# Patient Record
Sex: MALE | Race: WHITE | NOT HISPANIC OR LATINO | URBAN - METROPOLITAN AREA
[De-identification: names, ages, dates, MRNs, and addresses within clinical notes are randomized per-mention and may not be internally consistent; named-entity substitution may affect disease eponyms.]

---

## 2017-10-12 PROBLEM — G62.9 NEUROPATHY: Status: ACTIVE | Noted: 2017-10-12

## 2017-10-12 PROBLEM — Z78.9 NON-SMOKER: Status: ACTIVE | Noted: 2017-10-12

## 2017-10-12 PROBLEM — Z83.3 FAMILY HISTORY OF DIABETES MELLITUS: Status: ACTIVE | Noted: 2017-10-12

## 2017-10-12 PROBLEM — R50.9 FEVER: Status: ACTIVE | Noted: 2017-10-12

## 2018-06-27 PROBLEM — Z87.19 HISTORY OF DIVERTICULITIS: Status: RESOLVED | Noted: 2018-06-27 | Resolved: 2018-06-27

## 2020-01-08 PROBLEM — E05.90 HYPERTHYROIDISM, SUBCLINICAL: Status: ACTIVE | Noted: 2019-06-28

## 2020-05-15 PROBLEM — Z79.899 LONG-TERM USE OF IMMUNOSUPPRESSANT MEDICATION: Status: ACTIVE | Noted: 2020-05-15

## 2021-03-25 PROBLEM — M40.209 KYPHOSIS, ACQUIRED: Status: ACTIVE | Noted: 2021-03-25

## 2021-03-25 PROBLEM — M48.07 LUMBOSACRAL STENOSIS: Status: ACTIVE | Noted: 2021-03-25

## 2021-03-25 PROBLEM — M54.16 LUMBAR RADICULOPATHY: Status: ACTIVE | Noted: 2021-03-25

## 2021-03-25 PROBLEM — M41.9 SCOLIOSIS: Status: ACTIVE | Noted: 2021-03-25

## 2021-04-26 PROBLEM — E87.5 HYPERKALEMIA: Status: ACTIVE | Noted: 2018-11-14

## 2021-04-26 PROBLEM — M19.90 INFLAMMATORY ARTHRITIS: Status: ACTIVE | Noted: 2019-03-22

## 2021-04-26 PROBLEM — H04.123 BILATERAL DRY EYES: Status: ACTIVE | Noted: 2020-05-15

## 2021-04-26 PROBLEM — M54.5 LOW BACK PAIN: Status: ACTIVE | Noted: 2021-01-14

## 2021-04-26 PROBLEM — M79.604 PAIN AND SWELLING OF RIGHT LOWER EXTREMITY: Status: ACTIVE | Noted: 2020-09-22

## 2021-04-26 PROBLEM — M35.01 SJOGREN'S SYNDROME WITH KERATOCONJUNCTIVITIS SICCA: Status: ACTIVE | Noted: 2018-06-27

## 2021-04-26 PROBLEM — J82.81 CHRONIC EOSINOPHILIC PNEUMONIA: Status: ACTIVE | Noted: 2018-06-27

## 2021-04-26 PROBLEM — I73.00 RAYNAUD PHENOMENON: Status: ACTIVE | Noted: 2017-10-12

## 2024-07-13 ENCOUNTER — INPATIENT (INPATIENT)
Facility: HOSPITAL | Age: 69
LOS: 2 days | Discharge: HOME | DRG: 310 | End: 2024-07-16
Attending: INTERNAL MEDICINE | Admitting: INTERNAL MEDICINE
Payer: MEDICARE

## 2024-07-13 VITALS
RESPIRATION RATE: 18 BRPM | HEIGHT: 72 IN | TEMPERATURE: 98 F | DIASTOLIC BLOOD PRESSURE: 75 MMHG | HEART RATE: 57 BPM | OXYGEN SATURATION: 97 % | WEIGHT: 210.1 LBS | SYSTOLIC BLOOD PRESSURE: 157 MMHG

## 2024-07-13 DIAGNOSIS — R00.2 PALPITATIONS: ICD-10-CM

## 2024-07-13 DIAGNOSIS — R06.02 SHORTNESS OF BREATH: ICD-10-CM

## 2024-07-13 DIAGNOSIS — I10 ESSENTIAL (PRIMARY) HYPERTENSION: ICD-10-CM

## 2024-07-13 DIAGNOSIS — R79.89 OTHER SPECIFIED ABNORMAL FINDINGS OF BLOOD CHEMISTRY: ICD-10-CM

## 2024-07-13 LAB
ALBUMIN SERPL ELPH-MCNC: 4.4 G/DL — SIGNIFICANT CHANGE UP (ref 3.3–5)
ALP SERPL-CCNC: 126 U/L — HIGH (ref 40–120)
ALT FLD-CCNC: 13 U/L — SIGNIFICANT CHANGE UP (ref 10–45)
ANION GAP SERPL CALC-SCNC: 16 MMOL/L — SIGNIFICANT CHANGE UP (ref 5–17)
APPEARANCE UR: CLEAR — SIGNIFICANT CHANGE UP
AST SERPL-CCNC: 16 U/L — SIGNIFICANT CHANGE UP (ref 10–40)
BASOPHILS # BLD AUTO: 0.06 K/UL — SIGNIFICANT CHANGE UP (ref 0–0.2)
BASOPHILS NFR BLD AUTO: 0.7 % — SIGNIFICANT CHANGE UP (ref 0–2)
BILIRUB SERPL-MCNC: 0.8 MG/DL — SIGNIFICANT CHANGE UP (ref 0.2–1.2)
BILIRUB UR-MCNC: NEGATIVE — SIGNIFICANT CHANGE UP
BUN SERPL-MCNC: 16 MG/DL — SIGNIFICANT CHANGE UP (ref 7–23)
CALCIUM SERPL-MCNC: 9.6 MG/DL — SIGNIFICANT CHANGE UP (ref 8.4–10.5)
CHLORIDE SERPL-SCNC: 96 MMOL/L — SIGNIFICANT CHANGE UP (ref 96–108)
CO2 SERPL-SCNC: 23 MMOL/L — SIGNIFICANT CHANGE UP (ref 22–31)
COLOR SPEC: YELLOW — SIGNIFICANT CHANGE UP
CREAT SERPL-MCNC: 1.12 MG/DL — SIGNIFICANT CHANGE UP (ref 0.5–1.3)
DIFF PNL FLD: NEGATIVE — SIGNIFICANT CHANGE UP
EGFR: 70 ML/MIN/1.73M2 — SIGNIFICANT CHANGE UP
EOSINOPHIL # BLD AUTO: 0.22 K/UL — SIGNIFICANT CHANGE UP (ref 0–0.5)
EOSINOPHIL NFR BLD AUTO: 2.7 % — SIGNIFICANT CHANGE UP (ref 0–6)
GLUCOSE SERPL-MCNC: 119 MG/DL — HIGH (ref 70–99)
GLUCOSE UR QL: NEGATIVE MG/DL — SIGNIFICANT CHANGE UP
HCT VFR BLD CALC: 46 % — SIGNIFICANT CHANGE UP (ref 39–50)
HGB BLD-MCNC: 15.8 G/DL — SIGNIFICANT CHANGE UP (ref 13–17)
IMM GRANULOCYTES NFR BLD AUTO: 0.2 % — SIGNIFICANT CHANGE UP (ref 0–0.9)
KETONES UR-MCNC: NEGATIVE MG/DL — SIGNIFICANT CHANGE UP
LEUKOCYTE ESTERASE UR-ACNC: NEGATIVE — SIGNIFICANT CHANGE UP
LYMPHOCYTES # BLD AUTO: 1.27 K/UL — SIGNIFICANT CHANGE UP (ref 1–3.3)
LYMPHOCYTES # BLD AUTO: 15.6 % — SIGNIFICANT CHANGE UP (ref 13–44)
MAGNESIUM SERPL-MCNC: 2.2 MG/DL — SIGNIFICANT CHANGE UP (ref 1.6–2.6)
MCHC RBC-ENTMCNC: 30.4 PG — SIGNIFICANT CHANGE UP (ref 27–34)
MCHC RBC-ENTMCNC: 34.3 GM/DL — SIGNIFICANT CHANGE UP (ref 32–36)
MCV RBC AUTO: 88.6 FL — SIGNIFICANT CHANGE UP (ref 80–100)
MONOCYTES # BLD AUTO: 0.84 K/UL — SIGNIFICANT CHANGE UP (ref 0–0.9)
MONOCYTES NFR BLD AUTO: 10.3 % — SIGNIFICANT CHANGE UP (ref 2–14)
NEUTROPHILS # BLD AUTO: 5.75 K/UL — SIGNIFICANT CHANGE UP (ref 1.8–7.4)
NEUTROPHILS NFR BLD AUTO: 70.5 % — SIGNIFICANT CHANGE UP (ref 43–77)
NITRITE UR-MCNC: NEGATIVE — SIGNIFICANT CHANGE UP
NRBC # BLD: 0 /100 WBCS — SIGNIFICANT CHANGE UP (ref 0–0)
NT-PROBNP SERPL-SCNC: 230 PG/ML — SIGNIFICANT CHANGE UP (ref 0–300)
PH UR: 5.5 — SIGNIFICANT CHANGE UP (ref 5–8)
PLATELET # BLD AUTO: 261 K/UL — SIGNIFICANT CHANGE UP (ref 150–400)
POTASSIUM SERPL-MCNC: 3.9 MMOL/L — SIGNIFICANT CHANGE UP (ref 3.5–5.3)
POTASSIUM SERPL-SCNC: 3.9 MMOL/L — SIGNIFICANT CHANGE UP (ref 3.5–5.3)
PROT SERPL-MCNC: 8.4 G/DL — HIGH (ref 6–8.3)
PROT UR-MCNC: NEGATIVE MG/DL — SIGNIFICANT CHANGE UP
RBC # BLD: 5.19 M/UL — SIGNIFICANT CHANGE UP (ref 4.2–5.8)
RBC # FLD: 13.4 % — SIGNIFICANT CHANGE UP (ref 10.3–14.5)
SODIUM SERPL-SCNC: 135 MMOL/L — SIGNIFICANT CHANGE UP (ref 135–145)
SP GR SPEC: 1.01 — SIGNIFICANT CHANGE UP (ref 1–1.03)
T3 SERPL-MCNC: 126 NG/DL — SIGNIFICANT CHANGE UP (ref 80–200)
T4 AB SER-ACNC: 9.3 UG/DL — SIGNIFICANT CHANGE UP (ref 4.6–12)
T4 FREE SERPL-MCNC: 1.5 NG/DL — SIGNIFICANT CHANGE UP (ref 0.9–1.8)
TROPONIN T, HIGH SENSITIVITY RESULT: 14 NG/L — SIGNIFICANT CHANGE UP (ref 0–51)
TSH SERPL-MCNC: 0.01 UIU/ML — LOW (ref 0.27–4.2)
UROBILINOGEN FLD QL: 1 MG/DL — SIGNIFICANT CHANGE UP (ref 0.2–1)
WBC # BLD: 8.16 K/UL — SIGNIFICANT CHANGE UP (ref 3.8–10.5)
WBC # FLD AUTO: 8.16 K/UL — SIGNIFICANT CHANGE UP (ref 3.8–10.5)

## 2024-07-13 PROCEDURE — 71045 X-RAY EXAM CHEST 1 VIEW: CPT | Mod: 26

## 2024-07-13 PROCEDURE — 99285 EMERGENCY DEPT VISIT HI MDM: CPT

## 2024-07-13 RX ORDER — CLONIDINE HYDROCHLORIDE 0.3 MG/1
0.3 TABLET ORAL THREE TIMES A DAY
Refills: 0 | Status: DISCONTINUED | OUTPATIENT
Start: 2024-07-13 | End: 2024-07-15

## 2024-07-13 RX ORDER — ENOXAPARIN SODIUM 100 MG/ML
40 INJECTION SUBCUTANEOUS EVERY 24 HOURS
Refills: 0 | Status: DISCONTINUED | OUTPATIENT
Start: 2024-07-13 | End: 2024-07-16

## 2024-07-13 RX ORDER — CLONIDINE HYDROCHLORIDE 0.3 MG/1
0.3 TABLET ORAL THREE TIMES A DAY
Refills: 0 | Status: DISCONTINUED | OUTPATIENT
Start: 2024-07-13 | End: 2024-07-13

## 2024-07-13 RX ORDER — METOPROLOL TARTRATE 50 MG
100 TABLET ORAL DAILY
Refills: 0 | Status: DISCONTINUED | OUTPATIENT
Start: 2024-07-13 | End: 2024-07-15

## 2024-07-13 NOTE — H&P ADULT - TIME BILLING
Admission H&P including history , examination , reviewing test results and treatement plan . Consults called. D/W patient  and acp

## 2024-07-13 NOTE — ED PROVIDER NOTE - OBJECTIVE STATEMENT
69 yr male with PMH of thyroid disease and HTN, presents due to palpitations, began 4-5 days ago, are getting worse, came to the ED today due to "feeling they were more irregular." States his palpitations can be worse with movement and better with rest. Denies chest pain, nausea, vomiting, SOB at rest, inspiratory chest pain. He admits to SOB on exertion. He stakes Nicardipine and clonidine for his blood pressure. denies allergies

## 2024-07-13 NOTE — ED ADULT TRIAGE NOTE - AS HEIGHT TYPE
[facility] 75  coding opportunities       Chart reviewed, no opportunity found: CHART REVIEWED, NO OPPORTUNITY FOUND                        Patients insurance company: [employer] (Medicare Advantage and Commercial)
stated

## 2024-07-13 NOTE — ED PROVIDER NOTE - CLINICAL SUMMARY MEDICAL DECISION MAKING FREE TEXT BOX
69 yr male with PMH of thyroid disease and HTN, presents due to palpitations, began 4-5 days ago, are getting worse, came to the ED today due to "feeling they were more irregular." States his palpitations can be worse with movement and better with rest. Denies chest pain, nausea, vomiting, SOB at rest, inspiratory chest pain. He admits to SOB on exertion. He stakes Nicardipine and clonidine for his blood pressure. Physical exams reveal irregular heart rate, normal b/l breath sounds b/l. Ddx. hyperthyroidism, murmur, low suspicion for ACS due to lack of chest pain. Plan, EKG, trop and put pt. on cardiac monitor. 69 yr male with PMH of thyroid disease and HTN, presents due to palpitations, began 4-5 days ago, are getting worse, came to the ED today due to "feeling they were more irregular." States his palpitations can be worse with movement and better with rest. Denies chest pain, nausea, vomiting, SOB at rest, inspiratory chest pain. He admits to SOB on exertion. He stakes Nicardipine and clonidine for his blood pressure. Physical exams reveal irregular heart rate, normal b/l breath sounds b/l. Ddx. hyperthyroidism, murmur, low suspicion for ACS due to lack of chest pain. Plan, EKG, trop and put pt. on cardiac monitor.    When tried to walk patient went into bigeminy and experienced SOB. Likely candidate for admission.

## 2024-07-13 NOTE — ED ADULT NURSE NOTE - OBJECTIVE STATEMENT
69 yr old male to ED with c/o palpitations, irreg heart beat since tues with worsening SOB on exertion Has H/O HTN Denies fever or chills Denies chest pain.

## 2024-07-13 NOTE — ED ADULT TRIAGE NOTE - NS ED TRIAGE AVPU SCALE
Alert-The patient is alert, awake and responds to voice. The patient is oriented to time, place, and person. The triage nurse is able to obtain subjective information.
Routine  care

## 2024-07-13 NOTE — ED PROVIDER NOTE - PHYSICAL EXAMINATION
Vital signs reviewed.  CONSTITUTIONAL: NAD   HEAD: Normocephalic; atraumatic  EYES: EOMI, PERRL, no conjunctival injection, no scleral icterus  MOUTH/THROAT:  MMM  NECK: Trachea midline, no JVD  CV: irregular heart rate   RESP: normal work of breathing; CTAB   ABD: soft, non-distended; non-tender to palpation   : Deferred  MSK/EXT: no LE edema, no limited ROM  SKIN: some skin peeling on chest and abdomen  NEURO: Moves all extremities spontaneously with no focal deficits, speech is appropriate  PSYCH: well

## 2024-07-13 NOTE — PATIENT PROFILE ADULT - FALL HARM RISK - UNIVERSAL INTERVENTIONS
Bed in lowest position, wheels locked, appropriate side rails in place/Call bell, personal items and telephone in reach/Instruct patient to call for assistance before getting out of bed or chair/Non-slip footwear when patient is out of bed/[place] to call system/Physically safe environment - no spills, clutter or unnecessary equipment/Purposeful Proactive Rounding/Room/bathroom lighting operational, light cord in reach

## 2024-07-13 NOTE — H&P ADULT - NSICDXPASTMEDICALHX_GEN_ALL_CORE_FT
PAST MEDICAL HISTORY:  Back pain     Diverticulosis     HLD (hyperlipidemia)     HTN (hypertension)     Hypertension     Hyperthyroidism     Sjogren's syndrome

## 2024-07-14 LAB
ANION GAP SERPL CALC-SCNC: 12 MMOL/L — SIGNIFICANT CHANGE UP (ref 5–17)
BUN SERPL-MCNC: 12 MG/DL — SIGNIFICANT CHANGE UP (ref 7–23)
CALCIUM SERPL-MCNC: 9.2 MG/DL — SIGNIFICANT CHANGE UP (ref 8.4–10.5)
CHLORIDE SERPL-SCNC: 101 MMOL/L — SIGNIFICANT CHANGE UP (ref 96–108)
CO2 SERPL-SCNC: 23 MMOL/L — SIGNIFICANT CHANGE UP (ref 22–31)
CREAT SERPL-MCNC: 0.99 MG/DL — SIGNIFICANT CHANGE UP (ref 0.5–1.3)
CULTURE RESULTS: SIGNIFICANT CHANGE UP
EGFR: 81 ML/MIN/1.73M2 — SIGNIFICANT CHANGE UP
GLUCOSE SERPL-MCNC: 92 MG/DL — SIGNIFICANT CHANGE UP (ref 70–99)
POTASSIUM SERPL-MCNC: 4.2 MMOL/L — SIGNIFICANT CHANGE UP (ref 3.5–5.3)
POTASSIUM SERPL-SCNC: 4.2 MMOL/L — SIGNIFICANT CHANGE UP (ref 3.5–5.3)
SODIUM SERPL-SCNC: 136 MMOL/L — SIGNIFICANT CHANGE UP (ref 135–145)
SPECIMEN SOURCE: SIGNIFICANT CHANGE UP

## 2024-07-14 RX ADMIN — Medication 100 MILLIGRAM(S): at 05:46

## 2024-07-14 RX ADMIN — ENOXAPARIN SODIUM 40 MILLIGRAM(S): 100 INJECTION SUBCUTANEOUS at 05:46

## 2024-07-14 RX ADMIN — Medication 60 MILLIGRAM(S): at 05:46

## 2024-07-14 NOTE — CONSULT NOTE ADULT - SUBJECTIVE AND OBJECTIVE BOX
C A R D I O L O G Y  *********************    DATE OF SERVICE: 07-14-24    HISTORY OF PRESENT ILLNESS: HPI: Pt is a 69 year old male with a omh of HTN on clonidine and Nifidipine presents with palpitations. Reports a heart racing sensation. Denies any syncope, or chest pain. Follows with cardiology at [facility], reports no previous stress tests but reports previous echo with normal function. Does not smoke and does not consume excessive caffeine. Denies orthopnea, does have some LE edema which he attributes to Nifidipine      PAST MEDICAL & SURGICAL HISTORY:  Hypertension  Hyperthyroidism  Back pain  Sjogren's syndrome  HTN (hypertension)  HLD (hyperlipidemia)  Diverticulosis      MEDICATIONS:  MEDICATIONS  (STANDING):  enoxaparin Injectable 40 milliGRAM(s) SubCutaneous every 24 hours  metoprolol tartrate 100 milliGRAM(s) Oral daily  NIFEdipine XL 60 milliGRAM(s) Oral daily      Allergies  Cipro (Other)  NSAIDs (Unknown)        FAMILY HISTORY:Non-contributary for premature coronary disease or sudden cardiac death    SOCIAL HISTORY:    [X ] Non-smoker  [ ] Smoker  [ ] Alcohol    FLU VACCINE THIS YEAR STARTS IN AUGUST:  [ ] Yes    [ ] No    IF OVER 65 HAVE YOU EVER HAD A PNA VACCINE:  [ ] Yes    [ ] No       [ ] N/A      REVIEW OF SYSTEMS:  [ ]chest pain  [  ]shortness of breath  [  ]palpitations  [  ]syncope  [ ]near syncope [ ]upper extremity weakness   [ ] lower extremity weakness  [  ]diplopia  [  ]altered mental status   [  ]fevers  [ ]chills [ ]nausea  [ ]vomiting  [  ]dysphagia    [ ]abdominal pain  [ ]melena  [ ]BRBPR    [  ]epistaxis  [  ]rash    [ ]lower extremity edema    [X] All others negative	  [ ] Unable to obtain      LABS:	 	    CARDIAC MARKERS:               15.8   8.16  )-----------( 261      ( 13 Jul 2024 11:33 )             46.0     Hb Trend:     07-14    136  |  101  |  12  ----------------------------<  92  4.2   |  23  |  0.99    Ca    9.2      14 Jul 2024 05:58  Mg     2.2     07-13    TPro  8.4<H>  /  Alb  4.4  /  TBili  0.8  /  DBili  x   /  AST  16  /  ALT  13  /  AlkPhos  126<H>  07-13    Creatinine Trend: 0.99<--, 1.12<--        PHYSICAL EXAM:  T(C): 36.5 (07-14-24 @ 08:00), Max: 36.7 (07-13-24 @ 13:32)  HR: 65 (07-14-24 @ 08:00) (60 - 89)  BP: 142/77 (07-14-24 @ 08:00) (141/81 - 175/83)  RR: 18 (07-14-24 @ 08:00) (18 - 20)  SpO2: 97% (07-14-24 @ 08:00) (95% - 98%)  Wt(kg): --   BMI (kg/m2): 28.5 (07-13-24 @ 10:38)  I&O's Summary    14 Jul 2024 07:01  -  14 Jul 2024 11:56  --------------------------------------------------------  IN: 240 mL / OUT: 0 mL / NET: 240 mL      General:  Alert and Oriented * 3.   Head: Normocephalic and atraumatic.   Neck: No JVD. No bruits. Supple. Does not appear to be enlarged.   Cardiovascular: + S1,S2 ; RRR Soft systolic murmur at the left lower sternal border. No rubs noted.    Lungs: CTA b/l. No rhonchi, rales or wheezes.   Abdomen: + BS, soft. Non tender. Non distended. No rebound. No guarding.   Extremities: +edema  Neurologic: Moves all four extremities. Full range of motion.   Skin: Warm and moist. The patient's skin has normal elasticity and good skin turgor.   Psychiatric: Appropriate mood and affect.  Musculoskeletal: Normal range of motion, normal strength       TELEMETRY: 	  Frequent PVC    ECG:  	NSR with PVC      ASSESSMENT/PLAN:  Pt is a 69 year old male with a omh of HTN on clonidine and Nifidipine presents with palpitations. Reports a heart racing sensation. Denies any syncope, or chest pain. Follows with cardiology at [facility], reports no previous stress tests but reports previous echo with normal function. Does not smoke and does not consume excessive caffeine. Denies orthopnea, does have some LE edema which he attributes to Nifidipine      Palpitations  - PVC seen on EKG and tele  - not volume overloaded, TSH noted with subclinical hyperthroidism  - check TTE  - will call Dr. Zhang

## 2024-07-14 NOTE — PROGRESS NOTE ADULT - SUBJECTIVE AND OBJECTIVE BOX
Date of Service  : 07-14-24     INTERVAL HPI/OVERNIGHT EVENTS: I feel fine.   Vital Signs Last 24 Hrs  T(C): 36.6 (14 Jul 2024 12:00), Max: 36.6 (13 Jul 2024 19:35)  T(F): 97.8 (14 Jul 2024 12:00), Max: 97.8 (13 Jul 2024 19:35)  HR: 64 (14 Jul 2024 16:49) (60 - 89)  BP: 141/66 (14 Jul 2024 16:49) (136/75 - 149/76)  BP(mean): --  RR: 18 (14 Jul 2024 12:00) (18 - 20)  SpO2: 97% (14 Jul 2024 12:00) (95% - 97%)    Parameters below as of 14 Jul 2024 12:00  Patient On (Oxygen Delivery Method): room air      I&O's Summary    14 Jul 2024 07:01  -  14 Jul 2024 19:15  --------------------------------------------------------  IN: 480 mL / OUT: 0 mL / NET: 480 mL      MEDICATIONS  (STANDING):  enoxaparin Injectable 40 milliGRAM(s) SubCutaneous every 24 hours  metoprolol tartrate 100 milliGRAM(s) Oral daily  NIFEdipine XL 60 milliGRAM(s) Oral daily    MEDICATIONS  (PRN):  cloNIDine 0.3 milliGRAM(s) Oral three times a day PRN for SBP >150    LABS:                        15.8   8.16  )-----------( 261      ( 13 Jul 2024 11:33 )             46.0     07-14    136  |  101  |  12  ----------------------------<  92  4.2   |  23  |  0.99    Ca    9.2      14 Jul 2024 05:58  Mg     2.2     07-13    TPro  8.4<H>  /  Alb  4.4  /  TBili  0.8  /  DBili  x   /  AST  16  /  ALT  13  /  AlkPhos  126<H>  07-13      Urinalysis Basic - ( 14 Jul 2024 05:58 )    Color: x / Appearance: x / SG: x / pH: x  Gluc: 92 mg/dL / Ketone: x  / Bili: x / Urobili: x   Blood: x / Protein: x / Nitrite: x   Leuk Esterase: x / RBC: x / WBC x   Sq Epi: x / Non Sq Epi: x / Bacteria: x      CAPILLARY BLOOD GLUCOSE            Urinalysis Basic - ( 14 Jul 2024 05:58 )    Color: x / Appearance: x / SG: x / pH: x  Gluc: 92 mg/dL / Ketone: x  / Bili: x / Urobili: x   Blood: x / Protein: x / Nitrite: x   Leuk Esterase: x / RBC: x / WBC x   Sq Epi: x / Non Sq Epi: x / Bacteria: x      REVIEW OF SYSTEMS:  CONSTITUTIONAL: No fever, weight loss, or fatigue  EYES: No eye pain, visual disturbances, or discharge  ENMT:  No difficulty hearing, tinnitus, vertigo; No sinus or throat pain  NECK: No pain or stiffness  RESPIRATORY: No cough, wheezing, chills or hemoptysis; No shortness of breath  CARDIOVASCULAR: No chest pain, palpitations, dizziness, or leg swelling  GASTROINTESTINAL: No abdominal or epigastric pain. No nausea, vomiting, or hematemesis; No diarrhea or constipation. No melena or hematochezia.  GENITOURINARY: No dysuria, frequency, hematuria, or incontinence  NEUROLOGICAL: No headaches, memory loss, loss of strength, numbness, or tremors    Consultant(s) Notes Reviewed:  [x ] YES  [ ] NO    PHYSICAL EXAM:  GENERAL: NAD, well-groomed, well-developed,not in any distress ,  HEAD:  Atraumatic, Normocephalic  EYES: EOMI, PERRLA, conjunctiva and sclera clear  ENMT: No tonsillar erythema, exudates, or enlargement; Moist mucous membranes, Good dentition, No lesions  NECK: Supple, No JVD, Normal thyroid  NERVOUS SYSTEM:  Alert & Oriented X3, No focal deficit   CHEST/LUNG: Good air entry bilateral with no  rales, rhonchi, wheezing, or rubs  HEART: Regular rate and rhythm; No murmurs, rubs, or gallops  ABDOMEN: Soft, Nontender, Nondistended; Bowel sounds present  EXTREMITIES:  2+ Peripheral Pulses, No clubbing, cyanosis, or edema    Care Discussed with Consultants/Other Providers [ x] YES  [ ] NO

## 2024-07-15 PROCEDURE — 93306 TTE W/DOPPLER COMPLETE: CPT | Mod: 26

## 2024-07-15 PROCEDURE — 93010 ELECTROCARDIOGRAM REPORT: CPT

## 2024-07-15 RX ORDER — METOPROLOL TARTRATE 50 MG
1 TABLET ORAL
Refills: 0 | DISCHARGE

## 2024-07-15 RX ORDER — CHLORTHALIDONE 25 MG/1
1 TABLET ORAL
Qty: 30 | Refills: 0
Start: 2024-07-15 | End: 2024-08-13

## 2024-07-15 RX ORDER — CHLORTHALIDONE 25 MG/1
25 TABLET ORAL DAILY
Refills: 0 | Status: DISCONTINUED | OUTPATIENT
Start: 2024-07-15 | End: 2024-07-16

## 2024-07-15 RX ORDER — VALSARTAN 320 MG/1
40 TABLET ORAL DAILY
Refills: 0 | Status: DISCONTINUED | OUTPATIENT
Start: 2024-07-15 | End: 2024-07-16

## 2024-07-15 RX ORDER — METOPROLOL TARTRATE 50 MG
1 TABLET ORAL
Qty: 30 | Refills: 0
Start: 2024-07-15 | End: 2024-08-13

## 2024-07-15 RX ORDER — CLONIDINE HYDROCHLORIDE 0.3 MG/1
1 TABLET ORAL
Refills: 0 | DISCHARGE

## 2024-07-15 RX ORDER — VALSARTAN 320 MG/1
1 TABLET ORAL
Qty: 30 | Refills: 0
Start: 2024-07-15 | End: 2024-08-13

## 2024-07-15 RX ORDER — ACETAMINOPHEN 325 MG
650 TABLET ORAL ONCE
Refills: 0 | Status: COMPLETED | OUTPATIENT
Start: 2024-07-15 | End: 2024-07-15

## 2024-07-15 RX ORDER — METOPROLOL TARTRATE 50 MG
100 TABLET ORAL DAILY
Refills: 0 | Status: DISCONTINUED | OUTPATIENT
Start: 2024-07-16 | End: 2024-07-16

## 2024-07-15 RX ORDER — CLONIDINE HYDROCHLORIDE 0.3 MG/1
0.3 TABLET ORAL THREE TIMES A DAY
Refills: 0 | Status: DISCONTINUED | OUTPATIENT
Start: 2024-07-15 | End: 2024-07-15

## 2024-07-15 RX ADMIN — ENOXAPARIN SODIUM 40 MILLIGRAM(S): 100 INJECTION SUBCUTANEOUS at 05:12

## 2024-07-15 RX ADMIN — CLONIDINE HYDROCHLORIDE 0.3 MILLIGRAM(S): 0.3 TABLET ORAL at 12:28

## 2024-07-15 RX ADMIN — CLONIDINE HYDROCHLORIDE 0.3 MILLIGRAM(S): 0.3 TABLET ORAL at 00:31

## 2024-07-15 RX ADMIN — Medication 60 MILLIGRAM(S): at 05:11

## 2024-07-15 RX ADMIN — VALSARTAN 40 MILLIGRAM(S): 320 TABLET ORAL at 19:44

## 2024-07-15 RX ADMIN — Medication 100 MILLIGRAM(S): at 05:11

## 2024-07-15 RX ADMIN — Medication 30 MILLIGRAM(S): at 08:28

## 2024-07-15 RX ADMIN — CHLORTHALIDONE 25 MILLIGRAM(S): 25 TABLET ORAL at 19:45

## 2024-07-15 NOTE — DISCHARGE NOTE PROVIDER - CARE PROVIDERS DIRECT ADDRESSES
,DirectAddress_Unknown ,DirectAddress_Unknown,[email or web address]

## 2024-07-15 NOTE — DISCHARGE NOTE PROVIDER - NSDCMRMEDTOKEN_GEN_ALL_CORE_FT
chlorthalidone 25 mg oral tablet: 1 tab(s) orally once a day  NIFEdipine 60 mg oral tablet, extended release: 1 tab(s) orally once a day  Toprol- mg oral tablet, extended release: 1 tab(s) orally once a day  valsartan 40 mg oral tablet: 1 tab(s) orally once a day   chlorthalidone 25 mg oral tablet: 1 tab(s) orally once a day  NIFEdipine 60 mg oral tablet, extended release: 1 tab(s) orally once a day  Toprol- mg oral tablet, extended release: 1 tab(s) orally once a day  valsartan 40 mg oral tablet: 1 tab(s) orally 2 times a day

## 2024-07-15 NOTE — DISCHARGE NOTE PROVIDER - HOSPITAL COURSE
HPI:  69 yr male with PMH of thyroid disease and HTN, presents due to palpitations, began 4-5 days ago, are getting worse, came to the ED today due to "feeling they were more irregular." States his palpitations can be worse with movement and better with rest. Denies chest pain, nausea, vomiting, SOB at rest, inspiratory chest pain. He admits to SOB on exertion. He stakes Nicardipine and clonidine for his blood pressure. denies allergies (13 Jul 2024 16:29)    Hospital Course:  Pt is a 69 year old male with a omh of HTN on clonidine and Nifidipine presents with palpitations. Reports a heart racing sensation. Denies any syncope, or chest pain. Follows with cardiology at [facility], reports no previous stress tests but reports previous echo with normal function. Does not smoke and does not consume excessive caffeine. Denies orthopnea, does have some LE edema which he attributes to Nifidipine.    Seen by EP and cardiology. PVCs seen on EKG and tele. Not volume overloaded, TSH noted with subclinical hyperthyroidism. TTE with grossly normal LV function.  Also noted to have elevated.   Recommended to discharge on Toprol 100mg qd, valsartan 40mg qd, chlorzalidone 25mg qd, c/w nifedipine 60mg qd, discontinue clonidine, and follow up outpatient with Dr Roman.     Advanced Directives:   [x] Full code  [ ] DNR  [ ] Hospice    Discharge Diagnoses:  Palpitations   HTN

## 2024-07-15 NOTE — DISCHARGE NOTE PROVIDER - NSDCCPCAREPLAN_GEN_ALL_CORE_FT
PRINCIPAL DISCHARGE DIAGNOSIS  Diagnosis: Palpitations  Assessment and Plan of Treatment:   PVCs seen on EKG and telemetry. TSH noted with subclinical hyperthyroidism. TTE with grossly normal LV function.  Also noted to have elevated blood pressure.      SECONDARY DISCHARGE DIAGNOSES  Diagnosis: Hypertension  Assessment and Plan of Treatment: Recommended to discharge on Toprol 100 mg daily, valsartan 40 mg daily, chlorzalidone 25 mg daily. Continue with nifedipine 60 mg daily.  Follow up outpatient with Dr Roman.

## 2024-07-15 NOTE — DISCHARGE NOTE PROVIDER - CARE PROVIDER_API CALL
Danna Roman  Cardiovascular Disease  [address] [facility] [address] [facility], [address]  [place] [address]  Phone: [phone]  Fax: [phone]  Follow Up Time: Routine   Danna Roman  Cardiovascular Disease  [address] [facility] [facility], [address]  [place] [address]  Phone: [phone]  Fax: [phone]  Follow Up Time: Anaya Turner  Otolaryngology  [address] [place] [address], [address]  [place] [address]  Phone: [phone]  Fax: [phone]  Follow Up Time:

## 2024-07-15 NOTE — DISCHARGE NOTE PROVIDER - NSDCFUADDAPPT_GEN_ALL_CORE_FT
APPTS ARE READY TO BE MADE: [ x] YES    Best Family or Patient Contact (if needed):    Additional Information about above appointments (if needed):    1:   2:   3:     Other comments or requests:    APPTS ARE READY TO BE MADE: [ x] YES    Best Family or Patient Contact (if needed):    Additional Information about above appointments (if needed):    1:   2:   3:     Other comments or requests:     Patient was outreached but did not answer nor could a voicemail be left.

## 2024-07-15 NOTE — PROGRESS NOTE ADULT - SUBJECTIVE AND OBJECTIVE BOX
Date of Service  : 07-15-24   INTERVAL HPI/OVERNIGHT EVENTS: I feel fine   Vital Signs Last 24 Hrs  T(C): 36.4 (15 Jul 2024 11:19), Max: 36.8 (15 Jul 2024 00:00)  T(F): 97.6 (15 Jul 2024 11:19), Max: 98.3 (15 Jul 2024 00:00)  HR: 71 (15 Jul 2024 11:19) (56 - 96)  BP: 176/98 (15 Jul 2024 11:19) (141/66 - 176/98)  BP(mean): --  RR: 18 (15 Jul 2024 11:19) (18 - 18)  SpO2: 94% (15 Jul 2024 11:19) (94% - 97%)    Parameters below as of 15 Jul 2024 11:19  Patient On (Oxygen Delivery Method): room air      I&O's Summary    14 Jul 2024 07:01  -  15 Jul 2024 07:00  --------------------------------------------------------  IN: 480 mL / OUT: 0 mL / NET: 480 mL    15 Jul 2024 07:01  -  15 Jul 2024 13:36  --------------------------------------------------------  IN: 300 mL / OUT: 0 mL / NET: 300 mL      MEDICATIONS  (STANDING):  enoxaparin Injectable 40 milliGRAM(s) SubCutaneous every 24 hours  metoprolol tartrate 100 milliGRAM(s) Oral daily  NIFEdipine XL 60 milliGRAM(s) Oral daily    MEDICATIONS  (PRN):  cloNIDine 0.3 milliGRAM(s) Oral three times a day PRN for SBP >150    LABS:    07-14    136  |  101  |  12  ----------------------------<  92  4.2   |  23  |  0.99    Ca    9.2      14 Jul 2024 05:58        Urinalysis Basic - ( 14 Jul 2024 05:58 )    Color: x / Appearance: x / SG: x / pH: x  Gluc: 92 mg/dL / Ketone: x  / Bili: x / Urobili: x   Blood: x / Protein: x / Nitrite: x   Leuk Esterase: x / RBC: x / WBC x   Sq Epi: x / Non Sq Epi: x / Bacteria: x      CAPILLARY BLOOD GLUCOSE            Urinalysis Basic - ( 14 Jul 2024 05:58 )    Color: x / Appearance: x / SG: x / pH: x  Gluc: 92 mg/dL / Ketone: x  / Bili: x / Urobili: x   Blood: x / Protein: x / Nitrite: x   Leuk Esterase: x / RBC: x / WBC x   Sq Epi: x / Non Sq Epi: x / Bacteria: x      REVIEW OF SYSTEMS:  CONSTITUTIONAL: No fever, weight loss, or fatigue  EYES: No eye pain, visual disturbances, or discharge  ENMT:  No difficulty hearing, tinnitus, vertigo; No sinus or throat pain  NECK: No pain or stiffness  RESPIRATORY: No cough, wheezing, chills or hemoptysis; No shortness of breath  CARDIOVASCULAR: No chest pain, palpitations, dizziness, or leg swelling  GASTROINTESTINAL: No abdominal or epigastric pain. No nausea, vomiting, or hematemesis; No diarrhea or constipation. No melena or hematochezia.  GENITOURINARY: No dysuria, frequency, hematuria, or incontinence  NEUROLOGICAL: No headaches, memory loss, loss of strength, numbness, or tremors    Consultant(s) Notes Reviewed:  [x ] YES  [ ] NO    PHYSICAL EXAM:  GENERAL: NAD, well-groomed, well-developed,not in any distress ,  HEAD:  Atraumatic, Normocephalic  EYES: EOMI, PERRLA, conjunctiva and sclera clear  ENMT: No tonsillar erythema, exudates, or enlargement; Moist mucous membranes, Good dentition, No lesions  NECK: Supple, No JVD, Normal thyroid  NERVOUS SYSTEM:  Alert & Oriented X3, No focal deficit   CHEST/LUNG: Good air entry bilateral with no  rales, rhonchi, wheezing, or rubs  HEART: Regular rate and rhythm; No murmurs, rubs, or gallops  ABDOMEN: Soft, Nontender, Nondistended; Bowel sounds present  EXTREMITIES:  2+ Peripheral Pulses, No clubbing, cyanosis, or edema    Care Discussed with Consultants/Other Providers [ x] YES  [ ] NO

## 2024-07-15 NOTE — PROGRESS NOTE ADULT - SUBJECTIVE AND OBJECTIVE BOX
C A R D I O L O G Y  **********************************     DATE OF SERVICE: 07-15-24    Patient reports intermittent palpitations. denies chest pain or shortness of breath.   Review of systems otherwise negative.  	  MEDICATIONS:  MEDICATIONS  (STANDING):  enoxaparin Injectable 40 milliGRAM(s) SubCutaneous every 24 hours  metoprolol tartrate 100 milliGRAM(s) Oral daily  NIFEdipine XL 60 milliGRAM(s) Oral daily      LABS:	 	    CARDIAC MARKERS:            Hemoglobin: 15.8 g/dL (07-13 @ 11:33)      07-14    136  |  101  |  12  ----------------------------<  92  4.2   |  23  |  0.99    Ca    9.2      14 Jul 2024 05:58      Creatinine Trend: 0.99<--, 1.12<--    COAGS:       proBNP:   Lipid Profile:   HgA1c:   TSH:       PHYSICAL EXAM:  T(C): 36.4 (07-15-24 @ 11:19), Max: 36.8 (07-15-24 @ 00:00)  HR: 71 (07-15-24 @ 11:19) (56 - 96)  BP: 176/98 (07-15-24 @ 11:19) (141/66 - 176/98)  RR: 18 (07-15-24 @ 11:19) (18 - 18)  SpO2: 94% (07-15-24 @ 11:19) (94% - 97%)  Wt(kg): --  I&O's Summary    14 Jul 2024 07:01  -  15 Jul 2024 07:00  --------------------------------------------------------  IN: 480 mL / OUT: 0 mL / NET: 480 mL    15 Jul 2024 07:01  -  15 Jul 2024 13:48  --------------------------------------------------------  IN: 300 mL / OUT: 0 mL / NET: 300 mL          Gen: NAD  HEENT:  (-)icterus (-)pallor  CV: N S1 S2 1/6 JERRY (+)2 Pulses B/l  Resp:  Clear to auscultation B/L, normal effort  GI: (+) BS Soft, NT, ND  Lymph:  (-)Edema, (-)obvious lymphadenopathy  Skin: Warm to touch, Normal turgor  Psych: Appropriate mood and affect      TELEMETRY: NSR, PVCs, V Bigeminy/Trigeminy      < from: TTE W or WO Ultrasound Enhancing Agent (07.15.24 @ 08:30) >  CONCLUSIONS:      1. There was frequent ectopy (PVCs) throughout the study affecting assessment of systolic function.   2. Left ventricular systolic function is grossly normal with an ejection fraction visually estimated at 60 to 65 %.   3. Mild aortic regurgitation.   4. Mild aortic root dilation.   5. No prior echocardiogram is available for comparison.    < end of copied text >      ASSESSMENT/PLAN: Pt is a 69 year old male with a omh of HTN on clonidine and Nifidipine presents with palpitations. Reports a heart racing sensation. Denies any syncope, or chest pain. Follows with cardiology at [facility], reports no previous stress tests but reports previous echo with normal function. Does not smoke and does not consume excessive caffeine. Denies orthopnea, does have some LE edema which he attributes to Nifidipine    #Palpitations  - PVCs seen on EKG and tele  - not volume overloaded, TSH noted with subclinical hyperthroidism  - TTE with grossly normal LV function  - Continue Metoprolol  - EP consult appreciated - will f/u recs    #HTN  - Continue Nifedipine and Clonidine    Choco Sotelo PA-C  Pager: [phone]     C A R D I O L O G Y  **********************************     DATE OF SERVICE: 07-15-24    Patient reports intermittent palpitations. denies chest pain or shortness of breath.   Review of systems otherwise negative.  	  MEDICATIONS:  MEDICATIONS  (STANDING):  enoxaparin Injectable 40 milliGRAM(s) SubCutaneous every 24 hours  metoprolol tartrate 100 milliGRAM(s) Oral daily  NIFEdipine XL 60 milliGRAM(s) Oral daily      LABS:	 	    CARDIAC MARKERS:            Hemoglobin: 15.8 g/dL (07-13 @ 11:33)      07-14    136  |  101  |  12  ----------------------------<  92  4.2   |  23  |  0.99    Ca    9.2      14 Jul 2024 05:58      Creatinine Trend: 0.99<--, 1.12<--    COAGS:       proBNP:   Lipid Profile:   HgA1c:   TSH:       PHYSICAL EXAM:  T(C): 36.4 (07-15-24 @ 11:19), Max: 36.8 (07-15-24 @ 00:00)  HR: 71 (07-15-24 @ 11:19) (56 - 96)  BP: 176/98 (07-15-24 @ 11:19) (141/66 - 176/98)  RR: 18 (07-15-24 @ 11:19) (18 - 18)  SpO2: 94% (07-15-24 @ 11:19) (94% - 97%)  Wt(kg): --  I&O's Summary    14 Jul 2024 07:01  -  15 Jul 2024 07:00  --------------------------------------------------------  IN: 480 mL / OUT: 0 mL / NET: 480 mL    15 Jul 2024 07:01  -  15 Jul 2024 13:48  --------------------------------------------------------  IN: 300 mL / OUT: 0 mL / NET: 300 mL          Gen: NAD  HEENT:  (-)icterus (-)pallor  CV: N S1 S2 1/6 JERRY (+)2 Pulses B/l  Resp:  Clear to auscultation B/L, normal effort  GI: (+) BS Soft, NT, ND  Lymph:  (-)Edema, (-)obvious lymphadenopathy  Skin: Warm to touch, Normal turgor  Psych: Appropriate mood and affect      TELEMETRY: NSR, PVCs, V Bigeminy/Trigeminy      < from: TTE W or WO Ultrasound Enhancing Agent (07.15.24 @ 08:30) >  CONCLUSIONS:      1. There was frequent ectopy (PVCs) throughout the study affecting assessment of systolic function.   2. Left ventricular systolic function is grossly normal with an ejection fraction visually estimated at 60 to 65 %.   3. Mild aortic regurgitation.   4. Mild aortic root dilation.   5. No prior echocardiogram is available for comparison.    < end of copied text >      ASSESSMENT/PLAN: Pt is a 69 year old male with a omh of HTN on clonidine and Nifidipine presents with palpitations. Reports a heart racing sensation. Denies any syncope, or chest pain. Follows with cardiology at [facility], reports no previous stress tests but reports previous echo with normal function. Does not smoke and does not consume excessive caffeine. Denies orthopnea, does have some LE edema which he attributes to Nifidipine    #Palpitations  - PVCs seen on EKG and tele  - not volume overloaded, TSH noted with subclinical hyperthyroidism  - TTE with grossly normal LV function  - Continue Metoprolol  - EP consult appreciated - will f/u recs    #HTN  - Continue Nifedipine and Clonidine    Choco Sotelo PA-C  Pager: [phone]

## 2024-07-15 NOTE — CONSULT NOTE ADULT - SUBJECTIVE AND OBJECTIVE BOX
EP Attending    HISTORY OF PRESENT ILLNESS: HPI:  69 yr male with PMH of thyroid disease and HTN, presents due to palpitations, began 4-5 days ago, are getting worse, came to the ED today due to "feeling they were more irregular." States his palpitations can be worse with movement and better with rest. Denies chest pain, nausea, vomiting, SOB at rest, inspiratory chest pain. He admits to SOB on exertion. He stakes Nicardipine and clonidine for his blood pressure. denies allergies (13 Jul 2024 16:29)    Patient states he last felt 'normal' a few weeks ago.  In this time, hes developed daily palpitations that interfere with activity and with rest.  Having difficulty sleeping due to pounding sensation in his chest, and is now SOB upon taking one flight of stairs or walking on flat ground.  No fainting.  Has hx of HTN. Has tried thiazides and losartan in the past, but lately has been taking clonidine and nifedipine. Aware of LE edema side effect with nifedipine that has been ongoing for 'a long time', and seems unrelated to current symptomatic state.  No alcohol, tobacco or illicit drugs.  A 10 pt ROS is otherwise negative.    PAST MEDICAL & SURGICAL HISTORY:  Hypertension  Hyperthyroidism  Back pain  Sjogren's syndrome  HTN (hypertension)  HLD (hyperlipidemia)  Diverticulosis    MEDICATIONS  (STANDING):  enoxaparin Injectable 40 milliGRAM(s) SubCutaneous every 24 hours  metoprolol tartrate 100 milliGRAM(s) Oral daily  NIFEdipine XL 60 milliGRAM(s) Oral daily    Allergies    Cipro (Other)  NSAIDs (Unknown)    Intolerances    FAMILY HISTORY:    Non-contributary for premature coronary disease or sudden cardiac death    SOCIAL HISTORY:    [ x] Non-smoker  [ ] Smoker  [ ] Alcohol    PHYSICAL EXAM:  T(C): 36.6 (07-15-24 @ 04:00), Max: 36.8 (07-15-24 @ 00:00)  HR: 96 (07-15-24 @ 08:15) (56 - 96)  BP: 175/72 (07-15-24 @ 08:15) (136/75 - 175/72)  RR: 18 (07-15-24 @ 04:00) (18 - 18)  SpO2: 95% (07-15-24 @ 04:00) (94% - 97%)  Wt(kg): --    General: Well nourished, no acute distress, alert and oriented x 3  Head: normocephalic, no trauma  Neck: no JVD, no bruit, supple, not enlarged  CV: irregular S1S2 due to frequent ectopy, no murmurs.    Lungs: clear BL, no rales or wheezes  Abdomen: bowel sounds +, soft, nontender, nondistended  Extremities: no clubbing or cyanosis.  Nonpitting LE edema.  Neuro: Moves all 4 extremities, sensation intact x 4 extremities  Skin: warm and moist, normal turgor  Psych: Mood and affect are appropriate for circumstances  MSK: normal range of motion and strength x4 extremities.    TELEMETRY: NSR, frequent VPCs (monomorphic, bigeminy and trigeminy).	    ECG:  NSR with frequent VPCs.  Q V1, larger R waves in V3/V4.  + II/III/aVF.  Consistent w/ RVOT site of origin.  Echo:  pending  	  	  LABS:	 	                          15.8   8.16  )-----------( 261      ( 13 Jul 2024 11:33 )             46.0     07-14    136  |  101  |  12  ----------------------------<  92  4.2   |  23  |  0.99    Ca    9.2      14 Jul 2024 05:58  Mg     2.2     07-13    TPro  8.4<H>  /  Alb  4.4  /  TBili  0.8  /  DBili  x   /  AST  16  /  ALT  13  /  AlkPhos  126<H>  07-13      ASSESSMENT/PLAN: Mr Carmichael is a very pleasant 69y Male here with palpitations and shortness of breath at rest and with <4METS exertion.  Newly identified PVCs.  High burden estimated on telemetry.  Looks like RVOT site of origin.  Awaiting echocardiogram results.  If normal LVEF, patient states he wants to try Metoprolol and  be seen back in the office.  If LVEF abnormal, would reconsider staying for ablaiton.  Continue metoprolol.  Maintain K 4-4.5, Mg 2.  Maintain telemetry.      Herve Zhang M.D.  Cardiac Electrophysiology    office [phone]  pager [phone]

## 2024-07-15 NOTE — DISCHARGE NOTE PROVIDER - PROVIDER TOKENS
PROVIDER:[TOKEN:[[id number]],FOLLOWUP:[Routine]] PROVIDER:[TOKEN:[[id number]],FOLLOWUP:[Routine]],PROVIDER:[TOKEN:[[id number]]]

## 2024-07-16 VITALS — HEART RATE: 87 BPM | DIASTOLIC BLOOD PRESSURE: 77 MMHG | SYSTOLIC BLOOD PRESSURE: 164 MMHG

## 2024-07-16 PROCEDURE — 36415 COLL VENOUS BLD VENIPUNCTURE: CPT

## 2024-07-16 PROCEDURE — 71045 X-RAY EXAM CHEST 1 VIEW: CPT

## 2024-07-16 PROCEDURE — 84484 ASSAY OF TROPONIN QUANT: CPT

## 2024-07-16 PROCEDURE — 84439 ASSAY OF FREE THYROXINE: CPT

## 2024-07-16 PROCEDURE — 83880 ASSAY OF NATRIURETIC PEPTIDE: CPT

## 2024-07-16 PROCEDURE — 93306 TTE W/DOPPLER COMPLETE: CPT

## 2024-07-16 PROCEDURE — 84436 ASSAY OF TOTAL THYROXINE: CPT

## 2024-07-16 PROCEDURE — 81003 URINALYSIS AUTO W/O SCOPE: CPT

## 2024-07-16 PROCEDURE — 84480 ASSAY TRIIODOTHYRONINE (T3): CPT

## 2024-07-16 PROCEDURE — 80048 BASIC METABOLIC PNL TOTAL CA: CPT

## 2024-07-16 PROCEDURE — 87086 URINE CULTURE/COLONY COUNT: CPT

## 2024-07-16 PROCEDURE — 83735 ASSAY OF MAGNESIUM: CPT

## 2024-07-16 PROCEDURE — 93005 ELECTROCARDIOGRAM TRACING: CPT

## 2024-07-16 PROCEDURE — 99285 EMERGENCY DEPT VISIT HI MDM: CPT

## 2024-07-16 PROCEDURE — 80053 COMPREHEN METABOLIC PANEL: CPT

## 2024-07-16 PROCEDURE — 85025 COMPLETE CBC W/AUTO DIFF WBC: CPT

## 2024-07-16 PROCEDURE — 84443 ASSAY THYROID STIM HORMONE: CPT

## 2024-07-16 RX ORDER — VALSARTAN 320 MG/1
40 TABLET ORAL ONCE
Refills: 0 | Status: COMPLETED | OUTPATIENT
Start: 2024-07-16 | End: 2024-07-16

## 2024-07-16 RX ORDER — VALSARTAN 320 MG/1
1 TABLET ORAL
Qty: 60 | Refills: 0
Start: 2024-07-16 | End: 2024-08-14

## 2024-07-16 RX ORDER — VALSARTAN 320 MG/1
40 TABLET ORAL
Refills: 0 | Status: DISCONTINUED | OUTPATIENT
Start: 2024-07-16 | End: 2024-07-16

## 2024-07-16 RX ADMIN — VALSARTAN 40 MILLIGRAM(S): 320 TABLET ORAL at 19:00

## 2024-07-16 RX ADMIN — VALSARTAN 40 MILLIGRAM(S): 320 TABLET ORAL at 17:55

## 2024-07-16 RX ADMIN — Medication 100 MILLIGRAM(S): at 06:12

## 2024-07-16 RX ADMIN — Medication 60 MILLIGRAM(S): at 06:12

## 2024-07-16 RX ADMIN — CHLORTHALIDONE 25 MILLIGRAM(S): 25 TABLET ORAL at 06:12

## 2024-07-16 RX ADMIN — VALSARTAN 40 MILLIGRAM(S): 320 TABLET ORAL at 06:12

## 2024-07-16 RX ADMIN — ENOXAPARIN SODIUM 40 MILLIGRAM(S): 100 INJECTION SUBCUTANEOUS at 06:12

## 2024-07-16 NOTE — PROGRESS NOTE ADULT - SUBJECTIVE AND OBJECTIVE BOX
EP Attending    HISTORY OF PRESENT ILLNESS: HPI:  69 yr male with PMH of thyroid disease and HTN, presents due to palpitations, began 4-5 days ago, are getting worse, came to the ED today due to "feeling they were more irregular." States his palpitations can be worse with movement and better with rest. Denies chest pain, nausea, vomiting, SOB at rest, inspiratory chest pain. He admits to SOB on exertion. He stakes Nicardipine and clonidine for his blood pressure. denies allergies (13 Jul 2024 16:29)    Patient states he last felt 'normal' a few weeks ago.  In this time, hes developed daily palpitations that interfere with activity and with rest.  Having difficulty sleeping due to pounding sensation in his chest, and is now SOB upon taking one flight of stairs or walking on flat ground.  No fainting.  Has hx of HTN. Has tried thiazides and losartan in the past, but lately has been taking clonidine and nifedipine. Aware of LE edema side effect with nifedipine that has been ongoing for 'a long time', and seems unrelated to current symptomatic state.  No alcohol, tobacco or illicit drugs.  A 10 pt ROS is otherwise negative.  Date of service 7/16- resting in bed, no overnight issues. started new BP Rx, under somewhat better control today. Echo results reviewed re: LVEF%, valves, and Aorta measurements. We discussed importance of followup for his aorta dilatation, and timing of followup for PVC surveillance/ablation.    PAST MEDICAL & SURGICAL HISTORY:  Hypertension  Hyperthyroidism  Back pain  Sjogren's syndrome  HTN (hypertension)  HLD (hyperlipidemia)  Diverticulosis    chlorthalidone 25 milliGRAM(s) Oral daily  enoxaparin Injectable 40 milliGRAM(s) SubCutaneous every 24 hours  metoprolol succinate  milliGRAM(s) Oral daily  NIFEdipine XL 60 milliGRAM(s) Oral daily  valsartan 40 milliGRAM(s) Oral daily    T(C): 36.5 (07-16-24 @ 04:25), Max: 36.6 (07-15-24 @ 20:45)  HR: 98 (07-16-24 @ 04:25) (59 - 98)  BP: 165/80 (07-16-24 @ 06:00) (140/85 - 185/79)  RR: 18 (07-16-24 @ 04:25) (18 - 18)  SpO2: 95% (07-16-24 @ 04:25) (94% - 99%)  Wt(kg): --    I&O's Summary    15 Jul 2024 07:01  -  16 Jul 2024 07:00  --------------------------------------------------------  IN: 800 mL / OUT: 0 mL / NET: 800 mL    16 Jul 2024 07:01  -  16 Jul 2024 11:04  --------------------------------------------------------  IN: 240 mL / OUT: 0 mL / NET: 240 mL      General: Well nourished, no acute distress, alert and oriented x 3  Head: normocephalic, no trauma  Neck: no JVD, no bruit, supple, not enlarged  CV: irregular S1S2 due to frequent ectopy, no murmurs.    Lungs: clear BL, no rales or wheezes  Abdomen: bowel sounds +, soft, nontender, nondistended  Extremities: no clubbing or cyanosis.  Nonpitting LE edema.  Neuro: Moves all 4 extremities, sensation intact x 4 extremities  Skin: warm and moist, normal turgor  Psych: Mood and affect are appropriate for circumstances  MSK: normal range of motion and strength x4 extremities.    TELEMETRY: NSR, frequent VPCs (monomorphic, bigeminy and trigeminy).	    ECG:  NSR with frequent VPCs.  Q V1, larger R waves in V3/V4.  + II/III/aVF.  Consistent w/ RVOT site of origin.  Echo:  Normal LVEF, normal valves, ascending aorta 4.1cm.  	  ASSESSMENT/PLAN: Mr Carmichael is a very pleasant 69y Male here with palpitations and shortness of breath at rest and with <4METS exertion.  Newly identified PVCs.  High burden estimated on telemetry.  Looks like RVOT site of origin.  LVEF is normal.  Patient wants to continue metoprolol and assess PVC burden on office Holter / see if he feels better on medical management.  Otherwise, if he returns to the hospital with palpitations, or does not experience symptom-relief, he will be a good candidate for catheter ablation.  Continue metoprolol.  Continue antihypertensive regimen of valsartan, chlorthalidone, nifedipine. He will tolerate ankle edema.  Outpatient surveillance CT/MRI and CTSurg followup for dilated aortic root.  Maintain K 4-4.5, Mg 2.  Maintain telemetry.      Herve Zhang M.D.  Cardiac Electrophysiology    office [phone]  pager [phone]

## 2024-07-16 NOTE — PROVIDER CONTACT NOTE (OTHER) - ACTION/TREATMENT ORDERED:
Provider ordered 1 time dose tylenol and repeat bp in 1 hr.
Provider notified. No new order at this time. Will continue to monitor.
NORMA Peacock notified. EKG ordered. Give Nifedipine one time dose. Follow up BP in 1 hour

## 2024-07-16 NOTE — PROVIDER CONTACT NOTE (OTHER) - BACKGROUND
Pt was admitted for palpitation.
Pt admitted for palpitations with abnormal EKG w PVCs
Pt was admitted with palpitation with VPCS. Hx of HTN.

## 2024-07-16 NOTE — DISCHARGE NOTE NURSING/CASE MANAGEMENT/SOCIAL WORK - PATIENT PORTAL LINK FT
You can access the FollowMyHealth Patient Portal offered by [facility] by registering at the following website: [email or web address]. By joining [employer]’s FollowMyHealth portal, you will also be able to view your health information using other applications (apps) compatible with our system.

## 2024-07-16 NOTE — PROGRESS NOTE ADULT - SUBJECTIVE AND OBJECTIVE BOX
C A R D I O L O G Y  **********************************     DATE OF SERVICE: 07-16-24    Patient with intermittent palpitations. denies chest pain or shortness of breath.   Review of symptoms otherwise negative.    MEDICATIONS:  chlorthalidone 25 milliGRAM(s) Oral daily  enoxaparin Injectable 40 milliGRAM(s) SubCutaneous every 24 hours  metoprolol succinate  milliGRAM(s) Oral daily  NIFEdipine XL 60 milliGRAM(s) Oral daily  valsartan 40 milliGRAM(s) Oral daily      LABS:      Hemoglobin: 15.8 g/dL (07-13 @ 11:33)            Creatinine Trend: 0.99<--, 1.12<--    COAGS:           PHYSICAL EXAM:  T(C): 36.1 (07-16-24 @ 11:13), Max: 36.6 (07-15-24 @ 20:45)  HR: 98 (07-16-24 @ 04:25) (59 - 98)  BP: 157/87 (07-16-24 @ 11:13) (140/85 - 185/79)  RR: 18 (07-16-24 @ 11:13) (18 - 18)  SpO2: 95% (07-16-24 @ 11:13) (95% - 99%)  Wt(kg): --    I&O's Summary    15 Jul 2024 07:01  -  16 Jul 2024 07:00  --------------------------------------------------------  IN: 800 mL / OUT: 0 mL / NET: 800 mL    16 Jul 2024 07:01  -  16 Jul 2024 15:45  --------------------------------------------------------  IN: 480 mL / OUT: 0 mL / NET: 480 mL        Gen: NAD  HEENT:  (-)icterus (-)pallor  CV: N S1 S2 1/6 JERRY (+)2 Pulses B/l  Resp:  Clear to auscultation B/L, normal effort  GI: (+) BS Soft, NT, ND  Lymph:  (+) trace LE Edema, (-)obvious lymphadenopathy  Skin: Warm to touch, Normal turgor  Psych: Appropriate mood and affect      TELEMETRY: NSR, PVCs, V Bigeminy/Trigeminy      < from: TTE W or WO Ultrasound Enhancing Agent (07.15.24 @ 08:30) >  CONCLUSIONS:      1. There was frequent ectopy (PVCs) throughout the study affecting assessment of systolic function.   2. Left ventricular systolic function is grossly normal with an ejection fraction visually estimated at 60 to 65 %.   3. Mild aortic regurgitation.   4. Mild aortic root dilation.   5. No prior echocardiogram is available for comparison.    < end of copied text >      ASSESSMENT/PLAN: Pt is a 69 year old male with a omh of HTN on clonidine and Nifidipine presents with palpitations. Reports a heart racing sensation. Denies any syncope, or chest pain. Follows with cardiology at [facility], reports no previous stress tests but reports previous echo with normal function. Does not smoke and does not consume excessive caffeine. Denies orthopnea, does have some LE edema which he attributes to Nifidipine    #Palpitations  - PVCs seen on EKG and tele  - not volume overloaded, TSH noted with subclinical hyperthyroidism  - TTE with grossly normal LV function  - Started on Toprol XL 100mg daily  - EP eval appreciated    #HTN  - Continue Nifedipine XL 60mg daily, changed Clonidine to Valsartan 40mg daily and Chlorthalidone 25mg daily    - No further inpatient cardiac w/u planned - stable for discharge from CV perspective  - Patient to follow up in our [place] office with Dr. Roman on Thursday 7/25 at 2:45 PM ([id number] [place] [address], [place] [address], Office #[phone])    Choco Sotelo PA-C  Pager: [phone]

## 2024-07-16 NOTE — PROVIDER CONTACT NOTE (OTHER) - ASSESSMENT
A&Ox4.
Pt A&Ox4, able to make needs known. Pt c/o dizziness with standing. Pt hypertensive, other vitals stable. No s/s of bleeding. Pt denies cp, denies SOB, denies pain.
A&Ox4. Denies chest pain or sob.

## 2024-07-16 NOTE — PROGRESS NOTE ADULT - ASSESSMENT
69 yr male with PMH of thyroid disease and HTN, presents due to palpitations, began 4-5 days ago, are getting worse, came to the ED today due to "feeling they were more irregular." States his palpitations can be worse with movement and better with rest. Denies chest pain, nausea, vomiting, SOB at rest, inspiratory chest pain. He admits to SOB on exertion. He stakes Nicardipine and clonidine for his blood pressure. denies allergies       Problem/Plan - 1:  ·  Problem: Palpitations.   ·  Plan: EKG showing VPCS .  No chest pain etc.   TFT ok and TTE ok  Cardiology & EP help appreciated .  < from: TTE W or WO Ultrasound Enhancing Agent (07.15.24 @ 08:30) >  CONCLUSIONS:      1. There was frequent ectopy (PVCs) throughout the study affecting assessment of systolic function.   2. Left ventricular systolic function is grossly normal with an ejection fraction visually estimated at 60 to 65 %.   3. Mild aortic regurgitation.   4. Mild aortic root dilation.   5. No prior echocardiogram is available for comparison.    < end of copied text >       Problem/Plan - 2:  ·  Problem: HTN (hypertension).   ·  Plan: BP meds with hold parameters.  Will continue Clonidine .3 tid , Metoprolol 100 daily and Nifedipine 60mg .      Problem/Plan - 3:  ·  Problem: Low TSH level.   ·  Plan: TSH low but T4 normal.    Dispo : DC planning to F/U with EP outpt.       
69 yr male with PMH of thyroid disease and HTN, presents due to palpitations, began 4-5 days ago, are getting worse, came to the ED today due to "feeling they were more irregular." States his palpitations can be worse with movement and better with rest. Denies chest pain, nausea, vomiting, SOB at rest, inspiratory chest pain. He admits to SOB on exertion. He stakes Nicardipine and clonidine for his blood pressure. denies allergies       Problem/Plan - 1:  ·  Problem: Palpitations.   ·  Plan: EKG showing VPCS .  No chest pain etc.   TFT ok and TTE pending  Cardiology help appreciated .     Problem/Plan - 2:  ·  Problem: HTN (hypertension).   ·  Plan: BP meds with hold parameters.     Problem/Plan - 3:  ·  Problem: Low TSH level.   ·  Plan: TSH low but T4 normal.    Dispo : DC planning pending TTE.   
69 yr male with PMH of thyroid disease and HTN, presents due to palpitations, began 4-5 days ago, are getting worse, came to the ED today due to "feeling they were more irregular." States his palpitations can be worse with movement and better with rest. Denies chest pain, nausea, vomiting, SOB at rest, inspiratory chest pain. He admits to SOB on exertion. He stakes Nicardipine and clonidine for his blood pressure. denies allergies     Problem/Plan - 1:  ·  Problem: Palpitations.   ·  Plan: EKG showing VPCS .  No chest pain etc.   TFT ok and TTE ok  Cardiology & EP help appreciated .  < from: TTE W or WO Ultrasound Enhancing Agent (07.15.24 @ 08:30) >  CONCLUSIONS:      1. There was frequent ectopy (PVCs) throughout the study affecting assessment of systolic function.   2. Left ventricular systolic function is grossly normal with an ejection fraction visually estimated at 60 to 65 %.   3. Mild aortic regurgitation.   4. Mild aortic root dilation.   5. No prior echocardiogram is available for comparison.         Problem/Plan - 2:  ·  Problem: HTN (hypertension).   ·  Plan: BP meds with hold parameters.  Will continue Chlorthalidone , valsartan , Metoprolol 100 daily and Nifedipine 60mg .      Problem/Plan - 3:  ·  Problem: Low TSH level.   ·  Plan: TSH low but T4 normal.    Dispo : DC planning to F/U with EP & PCP outpt.       
Patient seen and examined, agree with above assessment and plan as transcribed above.    - Slow up titration of BP meds  - f/u in our officein  1 week    Ron Jensen MD, [facility]  BEEPER [phone]  
Patient seen and examined, agree with above assessment and plan as transcribed above.  - EP f/u  - cont BB    Ron Jensen MD, [facility]  BEEPER [phone]  
Aubrey HAMLIN

## 2024-07-16 NOTE — PROVIDER CONTACT NOTE (OTHER) - SITUATION
Pt /70, manual with stethoscope
Provider ordered tylenol x 1 for headache but pt reported he took his home med (tylenol) 30 mins ago.
/68 with mild headache.

## 2024-07-16 NOTE — PROGRESS NOTE ADULT - SUBJECTIVE AND OBJECTIVE BOX
Date of Service  : 07-16-24    INTERVAL HPI/OVERNIGHT EVENTS: I feel fine.   Vital Signs Last 24 Hrs  T(C): 36.1 (16 Jul 2024 11:13), Max: 36.6 (15 Jul 2024 20:45)  T(F): 97 (16 Jul 2024 11:13), Max: 97.9 (16 Jul 2024 00:16)  HR: 98 (16 Jul 2024 04:25) (59 - 98)  BP: 157/87 (16 Jul 2024 11:13) (140/85 - 185/79)  BP(mean): --  RR: 18 (16 Jul 2024 11:13) (18 - 18)  SpO2: 95% (16 Jul 2024 11:13) (95% - 99%)    Parameters below as of 16 Jul 2024 11:13  Patient On (Oxygen Delivery Method): room air      I&O's Summary    15 Jul 2024 07:01  -  16 Jul 2024 07:00  --------------------------------------------------------  IN: 800 mL / OUT: 0 mL / NET: 800 mL    16 Jul 2024 07:01  -  16 Jul 2024 14:38  --------------------------------------------------------  IN: 480 mL / OUT: 0 mL / NET: 480 mL      MEDICATIONS  (STANDING):  chlorthalidone 25 milliGRAM(s) Oral daily  enoxaparin Injectable 40 milliGRAM(s) SubCutaneous every 24 hours  metoprolol succinate  milliGRAM(s) Oral daily  NIFEdipine XL 60 milliGRAM(s) Oral daily  valsartan 40 milliGRAM(s) Oral daily    MEDICATIONS  (PRN):    LABS:              CAPILLARY BLOOD GLUCOSE              REVIEW OF SYSTEMS:  CONSTITUTIONAL: No fever, weight loss, or fatigue  EYES: No eye pain, visual disturbances, or discharge  ENMT:  No difficulty hearing, tinnitus, vertigo; No sinus or throat pain  NECK: No pain or stiffness  RESPIRATORY: No cough, wheezing, chills or hemoptysis; No shortness of breath  CARDIOVASCULAR: No chest pain, palpitations, dizziness, or leg swelling  GASTROINTESTINAL: No abdominal or epigastric pain. No nausea, vomiting, or hematemesis; No diarrhea or constipation. No melena or hematochezia.  GENITOURINARY: No dysuria, frequency, hematuria, or incontinence  NEUROLOGICAL: No headaches, memory loss, loss of strength, numbness, or tremors    Consultant(s) Notes Reviewed:  [x ] YES  [ ] NO    PHYSICAL EXAM:  GENERAL: NAD, well-groomed, well-developed,not in any distress ,  HEAD:  Atraumatic, Normocephalic  NECK: Supple, No JVD, Normal thyroid  NERVOUS SYSTEM:  Alert & Oriented X3, No focal deficit   CHEST/LUNG: Good air entry bilateral with no  rales, rhonchi, wheezing, or rubs  HEART: Regular rate and rhythm; No murmurs, rubs, or gallops  ABDOMEN: Soft, Nontender, Nondistended; Bowel sounds present  EXTREMITIES:  2+ Peripheral Pulses, No clubbing, cyanosis, or edema    Care Discussed with Consultants/Other Providers [ x] YES  [ ] NO

## 2024-07-16 NOTE — PHARMACOTHERAPY INTERVENTION NOTE - COMMENTS
Counseled patient on the new medications started here in the hospital. Informed patients the indication, directions and side effects of the medications. Medication listed below.     MEDICATIONS  (STANDING):  chlorthalidone 25 milliGRAM(s) Oral daily  metoprolol succinate  milliGRAM(s) Oral daily  NIFEdipine XL 60 milliGRAM(s) Oral daily  valsartan 40 milliGRAM(s) Oral daily    Patient was complaining about peripheral edema in his lower legs due to nifedipine xl 60 mg. He is requesting for an alternative for nifedipine. Recommend possible increasing valsartan to 80 or 160 mg orally once a day and taper off nifedipine.    Provided CareNotes information sheet to patient at bedside. Patient questions and concerns were answered and addressed. Patient demonstrated understanding.    Donato (Gerson Pisano  Transitions of Care Pharmacist  Available on [employer] Teams (Preferred)  Spectra: [phone]

## 2024-07-16 NOTE — PROVIDER CONTACT NOTE (OTHER) - REASON
/68
Pt /72, manual with stethoscope
Provider ordered tylenol x 1 for headache but pt reported he took his home med (tylenol) 30 mins ago.